# Patient Record
Sex: FEMALE | Race: WHITE
[De-identification: names, ages, dates, MRNs, and addresses within clinical notes are randomized per-mention and may not be internally consistent; named-entity substitution may affect disease eponyms.]

---

## 2019-11-19 ENCOUNTER — HOSPITAL ENCOUNTER (OUTPATIENT)
Dept: HOSPITAL 35 - PAIN | Age: 39
Discharge: HOME | End: 2019-11-19
Attending: ANESTHESIOLOGY
Payer: COMMERCIAL

## 2019-11-19 VITALS — WEIGHT: 177 LBS | HEIGHT: 64.02 IN | BODY MASS INDEX: 30.22 KG/M2

## 2019-11-19 VITALS — SYSTOLIC BLOOD PRESSURE: 132 MMHG | DIASTOLIC BLOOD PRESSURE: 99 MMHG

## 2019-11-19 DIAGNOSIS — M48.061: ICD-10-CM

## 2019-11-19 DIAGNOSIS — M47.896: ICD-10-CM

## 2019-11-19 DIAGNOSIS — M19.90: ICD-10-CM

## 2019-11-19 DIAGNOSIS — E07.9: ICD-10-CM

## 2019-11-19 DIAGNOSIS — J45.909: ICD-10-CM

## 2019-11-19 DIAGNOSIS — Z79.899: ICD-10-CM

## 2019-11-19 DIAGNOSIS — Z98.890: ICD-10-CM

## 2019-11-19 DIAGNOSIS — M54.5: Primary | ICD-10-CM

## 2019-11-19 DIAGNOSIS — I10: ICD-10-CM

## 2019-11-19 DIAGNOSIS — M17.0: ICD-10-CM

## 2019-11-19 DIAGNOSIS — G89.29: ICD-10-CM

## 2019-11-19 DIAGNOSIS — M79.605: ICD-10-CM

## 2019-11-19 NOTE — NUR
THIS NURSE CALLED PATIENT'S PRIMARY CARE PHYSICIAN, DR. ANTHONY WATTS AND
LEFT A VOICEMAIL ON THE NURSING LINE REGARDING PATIENT'S VISIT TODAY AND THAT
SHE OPTED TO LEAVE AMA POST-PROCEDURE WITH MAKING A SCENE ONCE SHE BECAME
AWARE THAT WE WOULD NOT BE WRITING PAIN MEDICATION AND THAT ANY MEDICATION
MANAGEMENT WOULD NEED TO BE CONTINUED WITH DR. WATTS. I LEFT OUR CONTACT
INFORMATION FOR REFERENCE.
 
THIS NURSE ALSO CALLED DR. LEMON'S OFFICE WHOM REFERRED THE PATIENT TO US AND
EXPLAINED THE PATIENT'S BEHAVIOR AND ACTIONS AFTER PROCEDURE. THEY ARE AWARE
THAT THE PATIENT DID RECEIVE TREATMENT AS REQUESTED AND THEY CAN FOLLOW UP
POST LESI. THEY WERE MADE AWARE THAT WE WOULD NOT BE CONTINUING CARE WITH THIS
PATIENT IN THE FUTURE. I SPOKE WITH ZITA BRANNON OF HIS OFFICE AND SHE WAS VERY
UNDERSTANDING AND APOLOGETIC OF THE SCENARIO AND WOULD MAKE DR. LEMON AWARE.

## 2019-11-19 NOTE — NUR
PATIENT VERY ANXIOUS AND ANGRY AT TIME OF DISCHARGE ONCE WE DISCUSSED WITH HER
THAT SHE WOULD NOT BE RECEIVING PAIN MEDICATIONS FROM OUR FACILITY. SHE BECAME
AGGITAED AND MAKING ACCUSATIONS THAT OUR PRACTICE WAS JUST TRYING TO TAKE HER
MONEY. RANTING AND TOSSING MEDICAL EQUIPMENT. THIS NURSE CALMLY EXPLAINED WITH
SECOND RN AT SIDE THAT WE AGREED TO TAKE HER CASE ON PER DR. FELICIANO AS
PROCEDURE ONLY AND THAT HER PRIMARY CARE WOULD NEED TO ADDRESS ANY MEDICATION
NEEDS. I APOLOGIZED FOR HER FRUSTRATIONS AND EXPLAINED THAT WE ARE IN FACT
CONTRIBUTING TO HER PAIN RELIEF BY PERFORMING THE INJECTION AND TRYING TO
AID IN GETTING HER RELIEF FROM A PROCEDURAL/MEDICAL STANDPOINT. VITAL SIGNS
WERE STABLE AT THE TIME SHE LEFT AGAINST MEDICAL ADVICE WITHOUT HER DISCHARGE
INSTRUCTIONS. PHYSICIAN MADE AWARE, BUT PATIENT STORMED OUT OF FACILITY.

## 2019-11-19 NOTE — NUR
Pain Clinic Assessment:
 
1. History of Osteoarthritis:
LOW BACK
BILAT KNEES
   History of Rheumatoid Arthritis:
Not Applicable
 
2. Height: 5 ft. 4 in. 162.6 cm.
   Weight: 177.0 lb.  oz. 80.287 kg.
   Patient's BMI: 30.4
 
3. Vital Signs:
   BP: 132/99 Pulse: 93 Resp: 14
   Temp:  02 Sat: 98 ECG Mon:
 
4. Pain Intensity: 7
 
5. Fall Risk:
   Dizziness: Y  Needs help standing or walking: N
   Fallen in the last 3 months: N
   Fall risk comments:
 
 
6. Patient on Blood Thinner: None
 
7. History of Hypertension: N
 
8. Opioid Therapy greater than 6 weeks: N
   Opiate Contract Signed:
 
9. Risk Assessment Tool Provided:
 
10. Functional Assessment Tool: 60/70
 
11. Recreational Drug Use: Never Drug Type:
    Tobacco Use: Never Smoker Tobacco Type:
       Amount or Packs/day:  How Many Years:
    Alcohol Use: No  Frequency:  Quant:

## 2019-11-20 NOTE — HPC
[facility]
[address]
[place]   [address]                     PAIN MANAGEMENT CONSULTATION  
_______________________________________________________________________________
 
Name:       BRADY GOYAL               Room #:                     REG ADITYA PANDA.#:      [id number]                       Account #:      [id number]  
Admission:  11/19/19    Attend Phys:    Catracho Resendez DO  
Discharge:              Date of Birth:  [birth year]  
                                                          Report #: [id number]
                                                                    [id number]   
_______________________________________________________________________________
THIS REPORT FOR:   //name//                          
 
CC: Dhara MCCLAIN NP
 
DATE OF SERVICE:  11/19/2019
 
 
CHIEF COMPLAINT:  Low back pain, left lower extremity pain.
 
HISTORY OF PRESENT ILLNESS:  As you know, the patient is a very unfortunate
39-year-old female who reports longstanding history of low back pain, left lower
extremity pain that has progressively worsened.  She states she has trialled
injections in the past, somewhere in 2016 where she underwent 2 epidural
injections, but these did not provide much in the way of improvement.  She "just
has put up with it over the past couple of years until which time her pain
returned."  She then sought evaluation through her PCP who referred the patient
to Neurosurgery for evaluation.  She saw a physician assistant, Madina Mcclain at
[facility] and was evaluated.  She was determined a possible
candidate for lumbar epidural injection and subsequently referred to our clinic
specifically for injection.  The patient states today, her pain is continuous,
describes the pain as shooting and stabbing, places current pain score 7/10,
daily average at 6-7/10, worst pain has been is 10/10.  The patient states that
lifting and doing activities exacerbate symptoms, hot baths and pain medications
and heating pads tend to improve pain.  She has been referred to our clinic to
undergo a lumbar epidural injection to address suspected lumbar radiculopathy.
 
PAST MEDICAL HISTORY:
1.  Seizure activity.
2.  Asthma.
3.  Hypertension.
4.  Thyroid disease.
5.  Multiple emotional problems.
6.  Degenerative joint disease.
7.  Osteoarthritis.
8.  Chronic low back pain.
 
PAST SURGICAL HISTORY:
1.  Hand surgery for a ganglion cyst.
2.  Bunionectomy.
3.  Release of a trigger thumb.
4.  Rectal prolapse surgery.
 
SOCIAL HISTORY:  The patient reports she is a nonsmoker, though she smells
strongly of tobacco smoke.  She denies IV or illicit drug use.  Denies any
 
 
 
[facility]
[address]
[place]   [address]                     PAIN MANAGEMENT CONSULTATION  
_______________________________________________________________________________
 
Name:       BRADY GOYAL               Room #:                     REG [facility].#:      [id number]                       Account #:      [id number]  
Admission:  11/19/19    Attend Phys:    Catracho Resendez DO  
Discharge:              Date of Birth:  [birth year]  
                                                          Report #: [id number]
                                                                    [id number]   
_______________________________________________________________________________
chronic alcohol use.  She is on disability, has been so for 12 years.  She is
receiving disability income on a monthly basis.  She is not in litigation in
regards to pain.  She is unaccompanied today.
 
REVIEW OF SYSTEMS:  Positive for weight gain, night sweats, fatigue and
weakness, frequent and recurrent headaches, wearing corrective eyewear, earaches
with drainage, shortness of breath walking or lying flat, palpitations, wheezing
and asthma, frequent urination, nocturia, painful menses, irregular menses, and
vaginal discharge.  Varicose veins, lightheadedness and dizziness, convulsions
and seizures, numbness and tingling sensations, tremors, memory loss with
confusion, nervousness, depression, insomnia, thyroid disease, excessive thirst,
urination, heat and cold intolerance, slow to heal after cuts.  All other review
of systems negative per 12-point review of systems other than those listed in
history of present illness.  Pain impact score 60/60 indicating complete
interference of daily activities secondary to pain.
 
ALLERGIES:  No known drug allergies.
 
CURRENT MEDICATIONS:  Magnesium 250 mg once a day, Strattera 25 mg once a day,
doxepin 10 mg once a day, Breo Ellipta 100/25 mcg inhaled once a day, albuterol
2 puffs q.4 hours, omega-3 fish oil 1 tab per day, Centrum Silver 1 tab per day,
sertraline 100 mg per day, gabapentin 300 mg 4 times a day, Vraylar 3 mg once a
day, ibuprofen 800 mg once a day, levothyroxine 125 mcg a day, prednisone 10 mg
once a day.
 
IMAGING:  MRI of lumbar spine obtained on 11/04/2019 shows T12-L1 unremarkable,
L1-L2 unremarkable, L2-L3 unremarkable.  L3-L4 unremarkable.  L4-L5 shows mild
facet hypertrophy, moderate disk space loss, mild annular disk bulge without
central canal stenosis.  There is no focal herniation.  Bilateral foraminal disk
osteophyte formation is noted resulting in moderate narrowing of the inferior
portion of the lateral foramen on the left approaching the inferior surface of
the left nerve, but no contact.  There is moderate narrowing of the anterior
right neural foramen, no contact of the nerve.  Thecal sac measures 1.3 cm. 
L4-L5 rudimentary disk without disk herniation, central canal stenosis or neural
foraminal stenosis, thecal sac measures 1.3 cm.
 
PQRS:  The patient has mild arthritic changes of the lumbar spine reports
bilateral knee osteoarthritis.  She is not suffering from rheumatoid arthritis. 
She is placing pain intensity today, 7/10, not a fall risk, has not had a fall
in the last 3 months.  She is not on blood thinners.  She is not treated for
hypertension.  She reports she is not on any opioids, but does have a high
opioid addiction potential.  This is based on our assessment tool.  Functional
assessment shows a 60/60 indicating complete interference of daily activities
secondary to due to pain.
 
PHYSICAL EXAMINATION:
 
 
 
[facility]
[address]
[place]   [address]                     PAIN MANAGEMENT CONSULTATION  
_______________________________________________________________________________
 
Name:       BRADY GOYAL               Room #:                     REG [facility]#:      [id number]                       Account #:      [id number]  
Admission:  11/19/19    Attend Phys:    Catracho Resendez DO  
Discharge:              Date of Birth:  [birth year]  
                                                          Report #: [id number]
                                                                    [id number]   
_______________________________________________________________________________
VITAL SIGNS:  Blood pressure 132/99, pulse 93, respiratory rate 14 and
unlabored.  The patient is 98% on room air.  Height 5 feet 4 inches tall, weight
177 pounds, BMI calculated 30.4.
GENERAL:  Well-developed, well-nourished, well-hydrated, anxious, diaphoretic,
39-year-old female appearing stated age, pain is rated at 7/10.
HEENT:  Normocephalic, atraumatic.  Pupils equal, round though somewhat smaller
size of the pupil.  She has difficulty maintaining eye contact.  Speech is
somewhat pressured.
LUNGS:  Clear, no wheeze, rhonchi or rales.
CARDIOVASCULAR:  Regular.  No appreciable gallop, no rub.
ABDOMEN:  Soft, obese, normoactive bowel sounds.
EXTREMITIES:  Show no clubbing, no cyanosis, and no edema.
MUSCULOSKELETAL:  Lower extremity strength appears symmetrical 5/5, intact to
light touch from L1 through S2 dermatomes.  Seated straight leg raising
negative.  Supine straight leg raising positive on the left.  Lester's test is
negative.  Modified Gaenslen's positive for axial low back pain.  Ankle clonus
negative.  Babinski is negative.  The patient has noted controllable movement in
the left lower extremity and upper extremity.  The patient indicates that these
are tremors, though they are not by physical examination:  These are intentional
movements which cease with distraction.
 
ASSESSMENT:
1.  Possible lumbar radiculopathy.
2.  Neural foraminal stenosis of lumbar spine.
3.  Lumbosacral spondylosis with possible radiculopathy.
4.  Chronic intractable pain.
 
PLAN:
1.  The patient has been referred to our service by physician assistant, Madina Mcclain to undergo lumbar epidural injection under fluoroscopic guidance to
address suspected lumbar radiculopathy.  If this was unsuccessful at alleviating
her symptoms for an extended period of time, then she was to undergo a L4 nerve
root block on the left side to determine if surgical options would be necessary.
 The patient was scheduled today to undergo a lumbar epidural injection under
fluoroscopic guidance.  Based on her physical exam, the description the patient
uses in regards to pain, it would appear the patient is suffering from lumbar
radiculopathy, though the findings of her MRI do not show significant
compression of the L4 nerve root at that L4-L5 level, nor is there compression
of the L5 nerve root.  We discussed with the patient the findings of the MRI. 
We also discussed the distribution of symptoms and after a very long discussion
of treatment, which we offered as the following physical therapy, stretching
exercise, core strengthening, we also discussed the epidural injection for which
the patient was referred to our clinic and surgical options.  The patient chose
to undergo the lumbar epidural injection.
 
The patient was advised risks and benefits of a lumbar epidural injection. 
 
 
 
[address]   [address]                     PAIN MANAGEMENT CONSULTATION  
_______________________________________________________________________________
 
Name:       RYNE GOYALMARILU GONZALES               Room #:                     REG HENRIK RYAN#:      [id number]                       Account #:      [id number]  
Admission:  11/19/19    Attend Phys:    Catracho Resendez DO  
Discharge:              Date of Birth:  [birth year]  
                                                          Report #: [id number]
                                                                    [id number]   
_______________________________________________________________________________
These risks include but are not necessarily limited to bleeding, bruising,
infection, worsening pain, no relief of pain, also risk of temporary or
permanent muscle weakness, temporary or permanent nerve damage, possible
paralysis, post-dural puncture headache and death.  The patient states
understood and wished to proceed.
2.  The patient requested that we provide her with a pain medication, I advised
the patient she was sent specifically to undergo epidural injections and nerve
blocks.  We did not agree to see the patient for medication management as we are
not taking on any new medication management patient at this time.  She became
quite upset and left our clinic AMA after undergoing a lumbar epidural
injection, but not completing the postoperative evaluation and observation.  She
became somewhat verbally belligerent to both my nursing staff who are monitoring
the observation area as well as the nurse who admitted the patient to our
clinic.  She removed all of the observation objective equipment including the
blood pressure cuff, pulse oximetry, and threw them to the floor excusing
herself.
3.  We have advised the patient upon her leaving AMA that we would not be
willing to see her back in consultation as she has not been compliant with our
treatment course.  It is unfortunate the patient is unwilling to follow through
with the requested treatment course as this may provide some benefit for the
foreseeable future.  We will be returning her care to the nurse practitioner,
Madina Mcclain and back to her PCP who is the prescribing physician of her current
medications.
 
PROCEDURE NOTE
 
PROCEDURE:  L5-S1 left paramedian epidural steroid injection under fluoroscopic
guidance.
 
This is the first procedure of the first series that the patient is undergoing.
 
After obtaining written consent, the patient was taken back to the fluoroscopy
suite, placed in a prone position with pillow under the abdomen to decrease
lumbar lordosis.  The skin overlying the lumbosacral area was then prepped and
draped in aseptic fashion.  The L5-S1 vertebral interspace was then identified
by AP fluoroscopy.  The skin and subcutaneous tissue overlying the target site
of injection was anesthetized with 3 mL 1% lidocaine.
 
A 20-gauge 3-1/2 inch Tuohy needle was then advanced under fluoroscopic guidance
towards the epidural space using a left paramedian approach.  The epidural space
was identified using loss of resistance to air technique.  After negative
aspiration for heme or cerebrospinal fluid, a total of 1 mL of Omnipaque was
injected.  A lumbar epidurogram was confirmed using both AP and lateral 
fluoroscopy.  After negative aspiration for heme or cerebrospinal fluid, 5 mL of
a solution containing 2 mL 40 mg per mL, 80 mg total triamcinolone along with 3
mL lidocaine 1% was injected in increments.  Contrast spread was noted post
 
 
 
[facility]
[address]
[place]   [address]                     PAIN MANAGEMENT CONSULTATION  
_______________________________________________________________________________
 
Name:       BRADY GOYAL               Room #:                     REG [facility].#:      [id number]                       Account #:      [id number]  
Admission:  11/19/19    Attend Phys:    Catracho Resendez DO  
Discharge:              Date of Birth:  [birth year]  
                                                          Report #: [id number]
                                                                    [id number]   
_______________________________________________________________________________
epidural space.  The needle was then retracted approximately half way and needle
tract flushed with 1 mL of 1% lidocaine.  Needle was then removed.  There were
no apparent sensory or motor deficits in the lower extremity following the
procedure.  A sterile bandage was placed over the injection site.
 
The heart rate, pulse, oximetry and blood pressure were continuously monitored
after the procedure.  There were no apparent complications.  The patient
tolerated the procedure well and was carefully escorted to the recovery room in
stable condition.  There were no apparent complications.  The patient left
against medical advice prior to completing the full observational section after
an epidural injection.
 
 
 
 
 
 
 
 
 
 
 
 
 
 
 
 
 
 
 
 
 
 
 
 
 
 
 
 
 
 
 
 
 
  <ELECTRONICALLY SIGNED>
   By: Catracho Resendez DO          
  11/20/19 0919
D: 11/19/19 1557                           _____________________________________
T: 11/19/19 2037                           Catracho Resendez DO            /nt